# Patient Record
Sex: MALE | Race: WHITE | NOT HISPANIC OR LATINO | Employment: FULL TIME | URBAN - METROPOLITAN AREA
[De-identification: names, ages, dates, MRNs, and addresses within clinical notes are randomized per-mention and may not be internally consistent; named-entity substitution may affect disease eponyms.]

---

## 2017-03-20 ENCOUNTER — HOSPITAL ENCOUNTER (OUTPATIENT)
Dept: FAMILY MEDICINE CLINIC | Facility: CLINIC | Age: 37
Setting detail: SPECIMEN
Discharge: HOME | End: 2017-03-20

## 2017-03-20 LAB
ALBUMIN SERPL-MCNC: 4 G/DL (ref 3.5–4.8)
ALBUMIN/GLOB SERPL: 1.5 {RATIO} (ref 1–1.7)
ALP SERPL-CCNC: 65 IU/L (ref 32–91)
ALT SERPL-CCNC: 57 IU/L (ref 17–63)
ANION GAP SERPL CALC-SCNC: 14 MMOL/L (ref 10–20)
AST SERPL-CCNC: 37 IU/L (ref 15–41)
BASOPHILS # BLD AUTO: 0 10*3/UL (ref 0–0.2)
BASOPHILS NFR BLD AUTO: 1 % (ref 0–2)
BILIRUB SERPL-MCNC: 1.6 MG/DL (ref 0.3–1.2)
BUN SERPL-MCNC: 8 MG/DL (ref 8–20)
BUN/CREAT SERPL: 8 (ref 6.2–20.3)
CALCIUM SERPL-MCNC: 9.3 MG/DL (ref 8.9–10.3)
CHLORIDE SERPL-SCNC: 107 MMOL/L (ref 101–111)
CHOLEST SERPL-MCNC: 222 MG/DL
CHOLEST/HDLC SERPL: 5.8 {RATIO}
CONV CO2: 24 MMOL/L (ref 22–32)
CONV LDL CHOLESTEROL DIRECT: 143 MG/DL (ref 0–100)
CONV TOTAL PROTEIN: 6.7 G/DL (ref 6.1–7.9)
CREAT UR-MCNC: 1 MG/DL (ref 0.7–1.2)
DIFFERENTIAL METHOD BLD: (no result)
EOSINOPHIL # BLD AUTO: 0.2 10*3/UL (ref 0–0.3)
EOSINOPHIL # BLD AUTO: 2 % (ref 0–3)
ERYTHROCYTE [DISTWIDTH] IN BLOOD BY AUTOMATED COUNT: 12.8 % (ref 11.5–14.5)
GLOBULIN UR ELPH-MCNC: 2.7 G/DL (ref 2.5–3.8)
GLUCOSE SERPL-MCNC: 111 MG/DL (ref 65–99)
HCT VFR BLD AUTO: 49.8 % (ref 40–54)
HDLC SERPL-MCNC: 39 MG/DL
HGB BLD-MCNC: 17.1 G/DL (ref 14–18)
LDLC/HDLC SERPL: 3.7 {RATIO}
LIPID INTERPRETATION: ABNORMAL
LYMPHOCYTES # BLD AUTO: 1.7 10*3/UL (ref 0.8–4.8)
LYMPHOCYTES NFR BLD AUTO: 22 % (ref 18–42)
MCH RBC QN AUTO: 32.3 PG (ref 26–32)
MCHC RBC AUTO-ENTMCNC: 34.4 G/DL (ref 32–36)
MCV RBC AUTO: 93.9 FL (ref 80–94)
MONOCYTES # BLD AUTO: 0.7 10*3/UL (ref 0.1–1.3)
MONOCYTES NFR BLD AUTO: 9 % (ref 2–11)
NEUTROPHILS # BLD AUTO: 5.1 10*3/UL (ref 2.3–8.6)
NEUTROPHILS NFR BLD AUTO: 66 % (ref 50–75)
NRBC BLD AUTO-RTO: 0 /100{WBCS}
NRBC/RBC NFR BLD MANUAL: 0 10*3/UL
PLATELET # BLD AUTO: 269 10*3/UL (ref 150–450)
PMV BLD AUTO: 8.7 FL (ref 7.4–10.4)
POTASSIUM SERPL-SCNC: 4 MMOL/L (ref 3.6–5.1)
RBC # BLD AUTO: 5.3 10*6/UL (ref 4.6–6)
SODIUM SERPL-SCNC: 141 MMOL/L (ref 136–144)
TRIGL SERPL-MCNC: 239 MG/DL
TSH SERPL-ACNC: 1.6 UIU/ML (ref 0.34–5.6)
VLDLC SERPL CALC-MCNC: 40.9 MG/DL
WBC # BLD AUTO: 7.6 10*3/UL (ref 4.5–11.5)

## 2020-03-16 ENCOUNTER — OFFICE VISIT (OUTPATIENT)
Dept: FAMILY MEDICINE CLINIC | Facility: CLINIC | Age: 40
End: 2020-03-16

## 2020-03-16 ENCOUNTER — TELEPHONE (OUTPATIENT)
Dept: FAMILY MEDICINE CLINIC | Facility: CLINIC | Age: 40
End: 2020-03-16

## 2020-03-16 VITALS
TEMPERATURE: 98.2 F | HEART RATE: 74 BPM | DIASTOLIC BLOOD PRESSURE: 82 MMHG | WEIGHT: 290 LBS | SYSTOLIC BLOOD PRESSURE: 115 MMHG | OXYGEN SATURATION: 98 %

## 2020-03-16 DIAGNOSIS — M54.50 ACUTE RIGHT-SIDED LOW BACK PAIN, UNSPECIFIED WHETHER SCIATICA PRESENT: Primary | ICD-10-CM

## 2020-03-16 DIAGNOSIS — M54.50 ACUTE RIGHT-SIDED LOW BACK PAIN, UNSPECIFIED WHETHER SCIATICA PRESENT: ICD-10-CM

## 2020-03-16 PROCEDURE — 99213 OFFICE O/P EST LOW 20 MIN: CPT | Performed by: FAMILY MEDICINE

## 2020-03-16 RX ORDER — AZITHROMYCIN 250 MG/1
TABLET, FILM COATED ORAL
COMMUNITY
Start: 2019-03-21

## 2020-03-16 RX ORDER — ATORVASTATIN CALCIUM 20 MG/1
20 TABLET, FILM COATED ORAL
COMMUNITY
Start: 2017-03-22

## 2020-03-16 RX ORDER — PREDNISONE 10 MG/1
TABLET ORAL
Qty: 40 TABLET | Refills: 0 | Status: SHIPPED | OUTPATIENT
Start: 2020-03-16

## 2020-03-16 NOTE — PROGRESS NOTES
Subjective   Chris Brooks is a 39 y.o. male.     He is in due to pain. He had pain in the RLQ area about 6 days ago. It went to the R flank area the next day and now is radiating down his leg and into his hip.  He denies any trauma of note.  He denies lifting or moving anything awkward of note.  He has not had any significant back issues previously.  He denies issues with bowel or bladder function or control.  He denies issues with fever or illness.  He denies any specific positions that are uncomfortable, and that the pain just seems to be random in nature.         /82 (BP Location: Right arm, Patient Position: Sitting, Cuff Size: Adult)   Pulse 74   Temp 98.2 °F (36.8 °C) (Oral)   Wt 132 kg (290 lb)   SpO2 98%       Chief Complaint   Patient presents with   • Back Pain     started in the front side area moved to the back started on Tuesday and pain down his leg at night    • Med Refill     wants chantix           Current Outpatient Medications:   •  atorvastatin (LIPITOR) 20 MG tablet, Take 20 mg by mouth., Disp: , Rfl:   •  azithromycin (ZITHROMAX Z-LESLIE) 250 MG tablet, ZITHROMAX Z-LESLIE 250 MG TABS, Disp: , Rfl:         The following portions of the patient's history were reviewed and updated as appropriate: allergies, current medications, past family history, past medical history, past social history, past surgical history and problem list.    Review of Systems   Constitutional: Negative for activity change, fatigue and fever.   HENT: Negative for congestion, sinus pressure, sinus pain, sore throat and trouble swallowing.    Eyes: Negative for visual disturbance.   Respiratory: Negative for chest tightness, shortness of breath and wheezing.    Cardiovascular: Negative for chest pain.   Gastrointestinal: Negative for abdominal distention, abdominal pain, constipation, diarrhea, nausea and vomiting.   Genitourinary: Negative for difficulty urinating and dysuria.   Musculoskeletal: Positive for back pain.  Negative for arthralgias, gait problem and neck pain.   Neurological: Negative for dizziness, weakness and light-headedness.   Psychiatric/Behavioral: Negative for agitation, hallucinations and suicidal ideas.       Objective   Physical Exam   HENT:   Mouth/Throat: No oropharyngeal exudate.   Neck: No thyromegaly present.   Cardiovascular: Normal rate, regular rhythm and normal heart sounds.   Pulmonary/Chest: Effort normal and breath sounds normal.   Abdominal: Soft. Bowel sounds are normal.   Musculoskeletal: Normal range of motion. He exhibits no edema or deformity.   Gait normal  Positive straight leg raise on right but not left   Lymphadenopathy:     He has no cervical adenopathy.   Skin: No rash noted.   Nursing note and vitals reviewed.        Assessment/Plan   Problems Addressed this Visit     None      Visit Diagnoses     Acute right-sided low back pain, unspecified whether sciatica present    -  Primary    Relevant Orders    XR Spine Lumbar 4+ View        At this time I will send him for an x-ray and put him on a steroid pack  He is to update me in a couple days on his response  Based on x-ray findings he may need referral to physical therapy, may need MRI, or may need to see an orthopedic spine doctor

## 2023-12-05 ENCOUNTER — OFFICE VISIT (OUTPATIENT)
Dept: PODIATRY | Facility: CLINIC | Age: 43
End: 2023-12-05
Payer: COMMERCIAL

## 2023-12-05 VITALS
BODY MASS INDEX: 41.35 KG/M2 | WEIGHT: 312 LBS | RESPIRATION RATE: 16 BRPM | HEIGHT: 73 IN | HEART RATE: 76 BPM | OXYGEN SATURATION: 98 %

## 2023-12-05 DIAGNOSIS — E66.01 MORBID OBESITY WITH BMI OF 40.0-44.9, ADULT: ICD-10-CM

## 2023-12-05 DIAGNOSIS — M25.572 ACUTE LEFT ANKLE PAIN: Primary | ICD-10-CM

## 2023-12-05 DIAGNOSIS — M21.862 ACQUIRED POSTERIOR EQUINUS, LEFT: ICD-10-CM

## 2023-12-05 DIAGNOSIS — M76.62 ACHILLES TENDINITIS, LEFT LEG: ICD-10-CM

## 2023-12-05 NOTE — PROGRESS NOTES
"12/05/2023  Foot and Ankle Surgery - New Patient   Provider: Dr. Demetris Amaya DPM  Location: [facility]    Subjective:  Chris Brooks is a 43 y.o. male.     Chief Complaint   Patient presents with    Left Ankle - Numbness, Edema, Pain     Lt foot achilles tendon pain x4 weeks fup [facility]   Pcp DR George last seen 2013    Left Foot - Numbness, Edema, Pain       HPI:  The patient is a 43-year-old male who presents to the clinic for issues involving the left foot and ankle. He is accompanied by an adult female.    He reports he injured his Achilles tendon approximately 4 weeks ago. He is unsure of the exact mechanism of injury. He states he woke up one morning and noticed a \"pull\" in his Achilles tendon, which caused him to limp. He notes the next day, the pain worsened. He states on 11/11/2023 he was unable to bear weight on his left lower extremity so he presented to the emergency room. He states the pain was mild to the touch with pressure. He notes he is able to bear weight on his left lower extremity now; however, if he puts weight on it too quick, he experiences a \"lightning bolt\" sensation. He reports he has a heel spur. He states he is very active.     No Known Allergies    Past Medical History:   Diagnosis Date    Abscess     Anxiety     Condition not found     LACERATION    Fatigue     History of diabetes as a child     HASN'T BEEN ON MEDS SINCE HE WAS 12 YEARS OLD    Hyperlipidemia     Infected sebaceous cyst     Polyuria     Rotator cuff tear     Shoulder pain, left     Smoker     Sweating increase        Past Surgical History:   Procedure Laterality Date    CATARACT EXTRACTION      VASECTOMY         Family History   Problem Relation Age of Onset    Hypertension Other     Hyperlipidemia Other     Diabetes Other     Heart disease Other        Social History     Socioeconomic History    Marital status:    Tobacco Use    Smoking status: Every Day     Packs/day: 1     Types: " "Cigarettes    Smokeless tobacco: Never   Vaping Use    Vaping Use: Every day    Substances: Nicotine    Devices: Pre-filled or refillable cartridge    Passive vaping exposure: Yes   Substance and Sexual Activity    Alcohol use: Defer    Drug use: Defer    Sexual activity: Defer        No current outpatient medications on file prior to visit.     No current facility-administered medications on file prior to visit.       Review of Systems:  General: Denies fever, chills, fatigue, and weakness.  Eyes: Denies vision loss, blurry vision, and excessive redness.  ENT: Denies hearing issues and difficulty swallowing.  Cardiovascular: Denies palpitations, chest pain, or syncopal episodes.  Respiratory: Denies shortness of breath, wheezing, and coughing.  GI: Denies abdominal pain, nausea, and vomiting.   : Denies frequency, hematuria, and urgency.  Musculoskeletal: Denies muscle cramps, joint pains, and stiffness.  Derm: Denies rash, open wounds, or suspicious lesions.  Neuro: Denies headaches, numbness, loss of coordination, and tremors.  Psych: Denies anxiety and depression.  Endocrine: Denies temperature intolerance and changes in appetite.  Heme: Denies bleeding disorders or abnormal bruising.     Objective   Pulse 76   Resp 16   Ht 185.4 cm (73\")   Wt (!) 142 kg (312 lb)   SpO2 98%   BMI 41.16 kg/m²     Foot/Ankle Exam    GENERAL  Appearance:  obese  Orientation:  AAOx3  Affect:  appropriate    VASCULAR     Right Foot Vascularity   Normal vascular exam    Dorsalis pedis:  2+  Posterior tibial:  2+  Skin temperature:  warm  Edema grading:  None  CFT:  < 3 seconds  Pedal hair growth:  Present  Varicosities:  none     Left Foot Vascularity   Normal vascular exam    Dorsalis pedis:  2+  Posterior tibial:  2+  Skin temperature:  warm  Edema grading:  None  CFT:  < 3 seconds  Pedal hair growth:  Present  Varicosities:  none     NEUROLOGIC     Right Foot Neurologic   Light touch sensation: normal  Hot/Cold sensation: " normal  Achilles reflex:  2+     Left Foot Neurologic   Light touch sensation: normal  Hot/Cold sensation:  normal  Achilles reflex:  2+    MUSCULOSKELETAL     Right Foot Musculoskeletal   Arch:  Normal     Left Foot Musculoskeletal   Arch:  Normal    MUSCLE STRENGTH     Right Foot Muscle Strength   Normal strength    Foot dorsiflexion:  5  Foot plantar flexion:  5  Foot inversion:  5  Foot eversion:  5     Left Foot Muscle Strength   Normal strength    Foot dorsiflexion:  5  Foot plantar flexion:  5  Foot inversion:  5  Foot eversion:  5    DERMATOLOGIC      Right Foot Dermatologic   Skin  Right foot skin is intact.   Nails comment:  Nails 1-5     Left Foot Dermatologic   Skin  Left foot skin is intact.   Nails comment:  Nails 1-5    TESTS     Right Foot Tests   Anterior drawer: negative  Varus tilt: negative     Left Foot Tests   Anterior drawer: negative  Varus tilt: negative     Left foot additional comments: Obese. Mild discomfort with palpation to the insertion of the Achilles tendon. No significant hypertrophy. No chelsi deficit noted. Moderate equinus contracture with knee extended and flexed. No resting deformity.      Assessment & Plan   Diagnoses and all orders for this visit:    1. Acute left ankle pain (Primary)  -     Cancel: XR Ankle 3+ View Left    2. Achilles tendinitis, left leg    3. Acquired posterior equinus, left    4. Morbid obesity with BMI of 40.0-44.9, adult      Patient presents to the office today for evaluation of issues involving his left foot and ankle. X-rays of the left foot and ankle were reviewed with the patient. Discussed the etiology and biomechanics involved with his left foot and ankle issues. Recommend patient slowly discontinue use of the knee scooter. Recommend physical therapy. Recommend stretching and strengthening exercises. Discussed avoidance of ambulating on uneven terrain. Recommend rest, ice, compression, elevation, and anti-inflammatory medications. Discussed a  steroid injection to suppress the inflammation. Recommend low-impact exercises. The patient will return to the office in 6 weeks for reevaluation.     Greater than 30 minutes was spent before, during, and after evaluation for patient care.    No orders of the defined types were placed in this encounter.       Note is dictated utilizing voice recognition software. Unfortunately this leads to occasional typographical errors. I apologize in advance if the situation occurs. If questions occur please do not hesitate to call our office.    Transcribed from ambient dictation for LIBBY Amaya DPM by Allan Strauss.  12/05/23   11:54 EST    Patient or patient representative verbalized consent to the visit recording.  I have personally performed the services described in this document as transcribed by the above individual, and it is both accurate and complete.